# Patient Record
Sex: MALE | Race: WHITE | URBAN - METROPOLITAN AREA
[De-identification: names, ages, dates, MRNs, and addresses within clinical notes are randomized per-mention and may not be internally consistent; named-entity substitution may affect disease eponyms.]

---

## 2020-08-17 ENCOUNTER — DOCTOR'S OFFICE (OUTPATIENT)
Dept: URBAN - METROPOLITAN AREA CLINIC 155 | Facility: CLINIC | Age: 70
Setting detail: OPHTHALMOLOGY
End: 2020-08-17
Payer: COMMERCIAL

## 2020-08-17 DIAGNOSIS — H40.013: ICD-10-CM

## 2020-08-17 DIAGNOSIS — E11.9: ICD-10-CM

## 2020-08-17 PROCEDURE — 92004 COMPRE OPH EXAM NEW PT 1/>: CPT | Performed by: OPTOMETRIST

## 2020-08-17 PROCEDURE — 92015 DETERMINE REFRACTIVE STATE: CPT | Performed by: OPTOMETRIST

## 2020-08-17 ASSESSMENT — REFRACTION_CURRENTRX
OD_AXIS: 020
OS_OVR_VA: 20/
OD_SPHERE: +0.25
OS_ADD: +2.25
OS_OVR_VA: 20/
OD_OVR_VA: 20/
OS_AXIS: 167
OD_VPRISM_DIRECTION: PROGS
OD_CYLINDER: +1.00
OD_OVR_VA: 20/
OD_OVR_VA: 20/
OS_VPRISM_DIRECTION: PROGS
OS_CYLINDER: +1.00
OD_ADD: +2.25
OS_OVR_VA: 20/
OS_SPHERE: -0.25

## 2020-08-17 ASSESSMENT — REFRACTION_MANIFEST
OU_VA: 20/
OS_CYLINDER: +1.25
OD_VA2: 20/20(J1+)
OS_VA2: 20/
OD_AXIS: 020
OD_CYLINDER: +1.00
OS_VA3: 20/
OD_ADD: +2.50
OS_ADD: +2.50
OS_SPHERE: -0.25
OS_VA1: 20/20
OU_VA: 20/
OS_AXIS: 165
OS_VA2: 20/20(J1+)
OD_VA1: 20/20
OS_VA3: 20/
OD_VA3: 20/
OD_VA2: 20/
OS_VA1: 20/
OD_VA1: 20/
OD_VA3: 20/
OD_SPHERE: +0.50

## 2020-08-17 ASSESSMENT — SPHEQUIV_DERIVED
OS_SPHEQUIV: 0.375
OS_SPHEQUIV: 0.75
OD_SPHEQUIV: 1
OD_SPHEQUIV: 0.875

## 2020-08-17 ASSESSMENT — KERATOMETRY
OS_K2POWER_DIOPTERS: 44.00
OD_K1POWER_DIOPTERS: 42.75
OD_K2POWER_DIOPTERS: 44.00
OD_AXISANGLE_DEGREES: 039
OS_K1POWER_DIOPTERS: 43.25
OS_AXISANGLE_DEGREES: 046

## 2020-08-17 ASSESSMENT — AXIALLENGTH_DERIVED
OS_AL: 23.2586
OS_AL: 23.4014
OD_AL: 23.2531
OD_AL: 23.3006

## 2020-08-17 ASSESSMENT — CONFRONTATIONAL VISUAL FIELD TEST (CVF)
OS_FINDINGS: FULL
OD_FINDINGS: FULL

## 2020-08-17 ASSESSMENT — REFRACTION_AUTOREFRACTION
OD_CYLINDER: +1.25
OS_CYLINDER: +1.50
OS_AXIS: 159
OD_AXIS: 029
OD_SPHERE: +0.25
OS_SPHERE: 0.00

## 2020-08-17 ASSESSMENT — VISUAL ACUITY
OD_BCVA: 20/20
OS_BCVA: 20/20

## 2022-01-03 ENCOUNTER — APPOINTMENT (OUTPATIENT)
Dept: UROLOGY | Facility: CLINIC | Age: 72
End: 2022-01-03
Payer: MEDICARE

## 2022-01-03 VITALS
TEMPERATURE: 98 F | HEART RATE: 74 BPM | WEIGHT: 197 LBS | BODY MASS INDEX: 28.2 KG/M2 | SYSTOLIC BLOOD PRESSURE: 152 MMHG | DIASTOLIC BLOOD PRESSURE: 85 MMHG | HEIGHT: 70 IN

## 2022-01-03 DIAGNOSIS — N40.1 BENIGN PROSTATIC HYPERPLASIA WITH LOWER URINARY TRACT SYMPMS: ICD-10-CM

## 2022-01-03 DIAGNOSIS — R10.9 UNSPECIFIED ABDOMINAL PAIN: ICD-10-CM

## 2022-01-03 DIAGNOSIS — Z78.9 OTHER SPECIFIED HEALTH STATUS: ICD-10-CM

## 2022-01-03 DIAGNOSIS — E11.9 TYPE 2 DIABETES MELLITUS W/OUT COMPLICATIONS: ICD-10-CM

## 2022-01-03 DIAGNOSIS — Z87.442 PERSONAL HISTORY OF URINARY CALCULI: ICD-10-CM

## 2022-01-03 PROBLEM — Z00.00 ENCOUNTER FOR PREVENTIVE HEALTH EXAMINATION: Status: ACTIVE | Noted: 2022-01-03

## 2022-01-03 PROCEDURE — 51741 ELECTRO-UROFLOWMETRY FIRST: CPT

## 2022-01-03 PROCEDURE — 99204 OFFICE O/P NEW MOD 45 MIN: CPT

## 2022-01-03 PROCEDURE — 51798 US URINE CAPACITY MEASURE: CPT

## 2022-01-03 RX ORDER — METFORMIN HYDROCHLORIDE 1000 MG/1
1000 TABLET, COATED ORAL
Refills: 0 | Status: ACTIVE | COMMUNITY

## 2022-01-03 RX ORDER — TAMSULOSIN HYDROCHLORIDE 0.4 MG/1
0.4 CAPSULE ORAL
Refills: 0 | Status: ACTIVE | COMMUNITY

## 2022-01-03 RX ORDER — DAPAGLIFLOZIN 10 MG/1
TABLET, FILM COATED ORAL
Refills: 0 | Status: ACTIVE | COMMUNITY

## 2022-01-03 RX ORDER — MELATONIN 3 MG
TABLET ORAL
Refills: 0 | Status: ACTIVE | COMMUNITY

## 2022-01-03 RX ORDER — GLIMEPIRIDE 2 MG/1
2 TABLET ORAL
Refills: 0 | Status: ACTIVE | COMMUNITY

## 2022-01-03 NOTE — HISTORY OF PRESENT ILLNESS
[FreeTextEntry1] : This is a 71-year-old  male who comes to see us after several years.  The patient has history of BPH, stable on tamsulosin, and kidney stones.  He now presents with 1 week history of intermittent left flank pain rating to the left lower quadrant without dysuria, hematuria, fever, nausea or vomiting.

## 2022-01-03 NOTE — LETTER BODY
[Dear  ___] : Dear  [unfilled], [Consult Letter:] : I had the pleasure of evaluating your patient, [unfilled]. [Please see my note below.] : Please see my note below. [Consult Closing:] : Thank you very much for allowing me to participate in the care of this patient.  If you have any questions, please do not hesitate to contact me. [Sincerely,] : Sincerely, [FreeTextEntry3] : Mark

## 2022-01-03 NOTE — REVIEW OF SYSTEMS
[Negative] : Heme/Lymph [Wake up at night to urinate  How many times?  ___] : denies waking up at night to urinate

## 2022-01-03 NOTE — ASSESSMENT
[FreeTextEntry1] : The patient has vague intermittent left flank pain which is likely musculoskeletal; however, because of his kidney stone history we must rule out recurrent lithiasis.  We will send his urine for analysis and culture and serum for PSA determination and will of course continue tamsulosin.  The patient will have a noncontrast CT scan, and if that is unremarkable, he will come back in 6 months.

## 2022-01-04 LAB
APPEARANCE: CLEAR
BACTERIA: NEGATIVE
BILIRUBIN URINE: NEGATIVE
BLOOD URINE: NEGATIVE
COLOR: NORMAL
GLUCOSE QUALITATIVE U: ABNORMAL
HYALINE CASTS: 0 /LPF
KETONES URINE: NORMAL
LEUKOCYTE ESTERASE URINE: NEGATIVE
MICROSCOPIC-UA: NORMAL
NITRITE URINE: NEGATIVE
PH URINE: 5.5
PROTEIN URINE: NEGATIVE
PSA, POST - PROSTATECTOMY: 2.23 NG/ML
RED BLOOD CELLS URINE: 2 /HPF
SPECIFIC GRAVITY URINE: 1.03
SQUAMOUS EPITHELIAL CELLS: 1 /HPF
UROBILINOGEN URINE: NORMAL
WHITE BLOOD CELLS URINE: 1 /HPF

## 2022-01-05 LAB — BACTERIA UR CULT: NORMAL

## 2022-01-11 ENCOUNTER — NON-APPOINTMENT (OUTPATIENT)
Age: 72
End: 2022-01-11

## 2022-03-07 ENCOUNTER — DOCTOR'S OFFICE (OUTPATIENT)
Dept: URBAN - METROPOLITAN AREA CLINIC 155 | Facility: CLINIC | Age: 72
Setting detail: OPHTHALMOLOGY
End: 2022-03-07
Payer: MEDICARE

## 2022-03-07 PROBLEM — E11.9 DIABETES TYPE 2 NO RETINOPATHY: Status: ACTIVE | Noted: 2020-08-17

## 2022-03-07 PROBLEM — H40.013 GLAUCOMA SUSPECT, LOW RISK; BOTH EYES: Status: ACTIVE | Noted: 2020-08-17

## 2022-03-07 PROBLEM — H52.4 PRESBYOPIA: Status: ACTIVE | Noted: 2020-08-17

## 2022-03-07 PROCEDURE — 92133 CPTRZD OPH DX IMG PST SGM ON: CPT | Performed by: OPTOMETRIST

## 2022-03-07 PROCEDURE — 92015 DETERMINE REFRACTIVE STATE: CPT | Performed by: OPTOMETRIST

## 2022-03-07 PROCEDURE — 92014 COMPRE OPH EXAM EST PT 1/>: CPT | Performed by: OPTOMETRIST

## 2022-03-07 ASSESSMENT — REFRACTION_AUTOREFRACTION
OD_CYLINDER: +1.75
OS_CYLINDER: +1.50
OS_SPHERE: -0.25
OD_AXIS: 022
OS_AXIS: 162
OD_SPHERE: -0.25

## 2022-03-07 ASSESSMENT — REFRACTION_MANIFEST
OS_AXIS: 165
OD_VA2: 20/20(J1+)
OS_SPHERE: PL
OS_VA1: 20/20
OS_VA2: 20/20(J1+)
OU_VA: 20/20
OD_VA1: 20/20
OD_AXIS: 020
OD_SPHERE: PL
OS_CYLINDER: +1.25
OD_ADD: +2.50
OS_ADD: +2.50
OD_CYLINDER: +1.00

## 2022-03-07 ASSESSMENT — REFRACTION_CURRENTRX
OD_AXIS: 020
OD_ADD: +2.25
OD_SPHERE: +0.50
OS_ADD: +2.25
OD_OVR_VA: 20/
OS_AXIS: 167
OS_OVR_VA: 20/
OS_CYLINDER: +1.00
OS_SPHERE: -0.25
OD_VPRISM_DIRECTION: PROGS
OS_VPRISM_DIRECTION: PROGS
OD_CYLINDER: +1.00

## 2022-03-07 ASSESSMENT — KERATOMETRY
OD_K1POWER_DIOPTERS: 43.00
OS_K1POWER_DIOPTERS: 43.25
OS_AXISANGLE_DEGREES: 151
OD_AXISANGLE_DEGREES: 044
OS_K2POWER_DIOPTERS: 44.00
OD_K2POWER_DIOPTERS: 44.00

## 2022-03-07 ASSESSMENT — CONFRONTATIONAL VISUAL FIELD TEST (CVF)
OD_FINDINGS: FULL
OS_FINDINGS: FULL

## 2022-03-07 ASSESSMENT — SPHEQUIV_DERIVED
OD_SPHEQUIV: 0.625
OS_SPHEQUIV: 0.5

## 2022-03-07 ASSESSMENT — TONOMETRY
OD_IOP_MMHG: 14
OS_IOP_MMHG: 14

## 2022-03-07 ASSESSMENT — VISUAL ACUITY
OD_BCVA: 20/20
OS_BCVA: 20/25

## 2022-03-07 ASSESSMENT — AXIALLENGTH_DERIVED
OS_AL: 23.3536
OD_AL: 23.3509

## 2025-07-08 ENCOUNTER — DOCTOR'S OFFICE (OUTPATIENT)
Dept: URBAN - METROPOLITAN AREA CLINIC 155 | Facility: CLINIC | Age: 75
Setting detail: OPHTHALMOLOGY
End: 2025-07-08
Payer: MEDICARE

## 2025-07-08 DIAGNOSIS — H40.013: ICD-10-CM

## 2025-07-08 DIAGNOSIS — H52.4: ICD-10-CM

## 2025-07-08 DIAGNOSIS — H25.13: ICD-10-CM

## 2025-07-08 DIAGNOSIS — E11.9: ICD-10-CM

## 2025-07-08 PROCEDURE — 92004 COMPRE OPH EXAM NEW PT 1/>: CPT | Performed by: OPHTHALMOLOGY

## 2025-07-08 PROCEDURE — 92133 CPTRZD OPH DX IMG PST SGM ON: CPT | Performed by: OPHTHALMOLOGY

## 2025-07-08 PROCEDURE — 92015 DETERMINE REFRACTIVE STATE: CPT | Performed by: OPHTHALMOLOGY

## 2025-07-08 ASSESSMENT — REFRACTION_CURRENTRX
OS_OVR_VA: 20/
OS_ADD: +2.25
OD_AXIS: 020
OS_AXIS: 167
OD_SPHERE: +0.50
OD_OVR_VA: 20/
OS_CYLINDER: +1.00
OD_VPRISM_DIRECTION: PROGS
OS_VPRISM_DIRECTION: PROGS
OD_CYLINDER: +1.00
OD_ADD: +2.25
OS_SPHERE: -0.25

## 2025-07-08 ASSESSMENT — REFRACTION_MANIFEST
OD_VA1: 20/20
OS_AXIS: 165
OS_SPHERE: PL
OS_ADD: +2.50
OU_VA: 20/20
OS_VA1: 20/20
OS_VA2: 20/20(J1+)
OD_VA1: 20/25
OD_SPHERE: -0.50
OU_VA: 20/20-1
OS_CYLINDER: +1.25
OD_VA2: 20/25(J1)
OS_ADD: +2.50
OD_AXIS: 020
OS_VA1: 20/25
OD_CYLINDER: +1.00
OD_ADD: +2.50
OD_SPHERE: PL
OD_CYLINDER: +1.50
OD_VA2: 20/20(J1+)
OD_AXIS: 020
OS_VA2: 20/BINOC
OD_ADD: +2.50
OS_CYLINDER: +1.50
OS_AXIS: 165
OS_SPHERE: -0.50

## 2025-07-08 ASSESSMENT — KERATOMETRY
OD_AXISANGLE_DEGREES: 129
OD_K1POWER_DIOPTERS: 44.00
OS_K1POWER_DIOPTERS: 44.00
OD_K2POWER_DIOPTERS: 42.75
OS_AXISANGLE_DEGREES: 056
OS_K2POWER_DIOPTERS: 43.00

## 2025-07-08 ASSESSMENT — REFRACTION_AUTOREFRACTION
OS_CYLINDER: +1.25
OD_SPHERE: -0.50
OS_SPHERE: -0.25
OD_AXIS: 015
OS_AXIS: 159
OD_CYLINDER: +2.25

## 2025-07-08 ASSESSMENT — TONOMETRY
OD_IOP_MMHG: 14
OS_IOP_MMHG: 15

## 2025-07-08 ASSESSMENT — VISUAL ACUITY
OD_BCVA: 20/20
OS_BCVA: 20/25

## 2025-07-08 ASSESSMENT — CONFRONTATIONAL VISUAL FIELD TEST (CVF)
OS_FINDINGS: FULL
OD_FINDINGS: FULL

## 2025-07-09 ENCOUNTER — APPOINTMENT (OUTPATIENT)
Dept: UROLOGY | Facility: CLINIC | Age: 75
End: 2025-07-09
Payer: MEDICARE

## 2025-07-09 VITALS
HEIGHT: 70 IN | HEART RATE: 71 BPM | OXYGEN SATURATION: 95 % | WEIGHT: 197 LBS | TEMPERATURE: 98 F | DIASTOLIC BLOOD PRESSURE: 79 MMHG | BODY MASS INDEX: 28.2 KG/M2 | SYSTOLIC BLOOD PRESSURE: 147 MMHG

## 2025-07-09 PROBLEM — N20.0 KIDNEY STONES, CALCIUM OXALATE: Status: ACTIVE | Noted: 2025-07-09

## 2025-07-09 PROCEDURE — 51741 ELECTRO-UROFLOWMETRY FIRST: CPT

## 2025-07-09 PROCEDURE — 51798 US URINE CAPACITY MEASURE: CPT

## 2025-07-09 PROCEDURE — 99204 OFFICE O/P NEW MOD 45 MIN: CPT

## 2025-07-09 PROCEDURE — 76705 ECHO EXAM OF ABDOMEN: CPT

## 2025-08-26 ENCOUNTER — APPOINTMENT (OUTPATIENT)
Dept: UROLOGY | Facility: AMBULATORY SURGERY CENTER | Age: 75
End: 2025-08-26

## 2025-09-18 ENCOUNTER — NON-APPOINTMENT (OUTPATIENT)
Age: 75
End: 2025-09-18

## 2025-09-24 PROBLEM — R33.9 INCOMPLETE BLADDER EMPTYING: Status: ACTIVE | Noted: 2025-09-24

## 2025-09-24 PROBLEM — R35.1 NOCTURIA MORE THAN TWICE PER NIGHT: Status: ACTIVE | Noted: 2025-09-24
